# Patient Record
Sex: FEMALE | Race: WHITE | Employment: OTHER | URBAN - METROPOLITAN AREA
[De-identification: names, ages, dates, MRNs, and addresses within clinical notes are randomized per-mention and may not be internally consistent; named-entity substitution may affect disease eponyms.]

---

## 2017-08-29 ENCOUNTER — TRANSFERRED RECORDS (OUTPATIENT)
Dept: HEALTH INFORMATION MANAGEMENT | Facility: CLINIC | Age: 82
End: 2017-08-29

## 2019-10-29 ENCOUNTER — TRANSFERRED RECORDS (OUTPATIENT)
Dept: HEALTH INFORMATION MANAGEMENT | Facility: CLINIC | Age: 84
End: 2019-10-29

## 2019-10-29 ENCOUNTER — MEDICAL CORRESPONDENCE (OUTPATIENT)
Dept: HEALTH INFORMATION MANAGEMENT | Facility: CLINIC | Age: 84
End: 2019-10-29

## 2019-10-31 ENCOUNTER — PRE VISIT (OUTPATIENT)
Dept: OPHTHALMOLOGY | Facility: CLINIC | Age: 84
End: 2019-10-31

## 2019-10-31 NOTE — TELEPHONE ENCOUNTER
FUTURE VISIT INFORMATION      FUTURE VISIT INFORMATION:    Date: 11/20/19    Time: 7:30am    Location: [facility]  REFERRAL INFORMATION:    Referring provider:  Dr. Piper Roberts, OD     Referring providers clinic:  [facility]     Reason for visit/diagnosis  Diplopia     RECORDS REQUESTED FROM:       Clinic name Comments Records Status Imaging Status   [facility]  recs received and scanned into chart EPIC    [facility] MRI Head Brain done 8/29/17- requested imaging be pushed to PACS

## 2019-11-18 ENCOUNTER — DOCUMENTATION ONLY (OUTPATIENT)
Dept: CARE COORDINATION | Facility: CLINIC | Age: 84
End: 2019-11-18

## 2019-11-20 ENCOUNTER — OFFICE VISIT (OUTPATIENT)
Dept: OPHTHALMOLOGY | Facility: CLINIC | Age: 84
End: 2019-11-20
Payer: MEDICARE

## 2019-11-20 DIAGNOSIS — H53.10 SUBJECTIVE VISUAL DISTURBANCE: Primary | ICD-10-CM

## 2019-11-20 DIAGNOSIS — H50.21 HYPOTROPIA OF RIGHT EYE: ICD-10-CM

## 2019-11-20 DIAGNOSIS — H53.2 DIPLOPIA: ICD-10-CM

## 2019-11-20 DIAGNOSIS — H53.2 DIPLOPIA: Primary | ICD-10-CM

## 2019-11-20 ASSESSMENT — CONF VISUAL FIELD
METHOD: COUNTING FINGERS
OS_NORMAL: 1
OD_NORMAL: 1

## 2019-11-20 ASSESSMENT — TONOMETRY
OD_IOP_MMHG: 11
OS_IOP_MMHG: 9
IOP_METHOD: ICARE

## 2019-11-20 ASSESSMENT — MARGIN REFLEX DISTANCE
OS_MRD1: 4
OD_MRD1: 4.5

## 2019-11-20 ASSESSMENT — CUP TO DISC RATIO
OS_RATIO: 0.2
OD_RATIO: 0.2

## 2019-11-20 ASSESSMENT — SLIT LAMP EXAM - LIDS
COMMENTS: NORMAL
COMMENTS: NORMAL

## 2019-11-20 ASSESSMENT — EXTERNAL EXAM - RIGHT EYE: OD_EXAM: NORMAL

## 2019-11-20 ASSESSMENT — VISUAL ACUITY
OS_SC: 20/20
OD_PH_SC: 20/30
METHOD: SNELLEN - LINEAR

## 2019-11-20 ASSESSMENT — EXTERNAL EXAM - LEFT EYE: OS_EXAM: NORMAL

## 2019-11-20 NOTE — PROGRESS NOTES
Assessment & Plan     Lisa Vyas is a 85 year old female with the following diagnoses:   1. Diplopia    2. Hypotropia of right eye         Patient is an 85 year old female sent by Dr. Roberts for consultation of diplopia. Patient reports that she developed intermittent binocular diagonal diplopia first noticed in the summer, but worse since September. She notes that the diplopia is worse when looking up. Notes that images overlap and are not completely separate. For the past two weeks she reports that she has parieto-occipital pain for the past two weeks. Denies diurnal variation, change in orientation of diplopia, ptosis, dysphagia, dyspnea, drooling, facial numbness, numbness/tingling, weakness, jaw claudication, unintended weight loss, scalp tenderness.     Brain MRI 2017    POH: Cataract surgery both eyes 2004. Using Systane  PMH: Thyroid nodules s/p biopsey 2017  FH: Negative for glaucoma, macular degeneration    Visual acuity is 20/40 right eye and 20/20 left eye. Intraocular pressure is normal. Pupils without afferetn pupillary defect. Color plates 10/11 both eyes. Confrontational visual fields full both eyes. Sensorimotor exam shows a Right hypotropia of 20-25 prism diopters in primary gaze that increases to 25-30 in upgaze. There is a comitant esotropia of 4 prism diopters. There is trace abduction deficit in both eyes, near total supraduction deficit right eye. 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 Slit lamp exam shows mild injection of the conjunctiva in both eyes temporally. Dilated fundus exam unremarkable.    It is my impression that Lisa has a right hypotropia worse in upgaze and right hypoglobus. Will obtain TSI and acetylcholine receptor antibody. I will also obtain MRI orbits to look for silent sinus syndrome or superior orbital mass.  If everything normal will recheck in 4 months and if stable at that time could consider a surgical procedure.              Attending Physician  Attestation:  Complete documentation of historical and exam elements from today's encounter can be found in the full encounter summary report (not reduplicated in this progress note).  I personally obtained the chief complaint(s) and history of present illness.  I confirmed and edited as necessary the review of systems, past medical/surgical history, family history, social history, and examination findings as documented by others; and I examined the patient myself.  I personally reviewed the relevant tests, images, and reports as documented above.  I formulated and edited as necessary the assessment and plan and discussed the findings and management plan with the patient and family. - Brandon Worrell MD  Ophthalmology, PGY-5  Neuro-Ophthalmology Fellow

## 2019-11-20 NOTE — Clinical Note
11/20/2019       RE: Lisa Vyas  [address]   [place] [address]     Dear Colleague,    Thank you for referring your patient, Lisa Vyas, to the [facility] OPHTHALMOLOGY at [facility]. Please see a copy of my visit note below.           Assessment & Plan     Lisa Vyas is a 85 year old female with the following diagnoses:   1. Diplopia    2. Hypotropia of right eye         Patient is an 85 year old female sent by Dr. Roberts for consultation of diplopia. Patient reports that she developed intermittent binocular diagonal diplopia first noticed in the summer, but worse since September. She notes that the diplopia is worse when looking up. Notes that images overlap and are not completely separate. For the past two weeks she reports that she has parieto-occipital pain for the past two weeks. Denies diurnal variation, change in orientation of diplopia, ptosis, dysphagia, dyspnea, drooling, facial numbness, numbness/tingling, weakness, jaw claudication, unintended weight loss, scalp tenderness.     Brain MRI 2017    POH: Cataract surgery both eyes 2004. Using Systane  PMH: Thyroid nodules s/p biopsey 2017  FH: Negative for glaucoma, macular degeneration    Visual acuity is 20/40 right eye and 20/20 left eye. Intraocular pressure is normal. Pupils without afferetn pupillary defect. Color plates 10/11 both eyes. Confrontational visual fields full both eyes. Sensorimotor exam shows a Right hypotropia of 20-25 prism diopters in primary gaze that increases to 25-30 in upgaze. There is a comitant esotropia of 4 prism diopters. There is trace abduction deficit in both eyes, near total supraduction deficit right eye. 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 Slit lamp exam shows mild injection of the conjunctiva in both eyes temporally. Dilated fundus exam unremarkable.    It is my impression that Lisa has a right hypotropia worse in upgaze and right hypoglobus. Will  obtain TSI and acetylcholine receptor antibody. I will also obtain MRI orbits to look for silent sinus syndrome or superior orbital mass.  If everything normal will recheck in 4 months and if stable at that time could consider a surgical procedure.              Attending Physician Attestation:  Complete documentation of historical and exam elements from today's encounter can be found in the full encounter summary report (not reduplicated in this progress note).  I personally obtained the chief complaint(s) and history of present illness.  I confirmed and edited as necessary the review of systems, past medical/surgical history, family history, social history, and examination findings as documented by others; and I examined the patient myself.  I personally reviewed the relevant tests, images, and reports as documented above.  I formulated and edited as necessary the assessment and plan and discussed the findings and management plan with the patient and family. - Brandon Worrell MD  Ophthalmology, PGY-5  Neuro-Ophthalmology Fellow      Again, thank you for allowing me to participate in the care of your patient.      Sincerely,    Brandon Neil MD

## 2019-11-20 NOTE — LETTER
2019         RE:  :  MRN: Lisa Vyas  1933  [id number]     Dear Dr. Roberts,    Thank you for asking me to see your very pleasant patient, Lisa Vyas, in neuro-ophthalmic consultation.  I would like to thank you for sending your records and I have summarized them in the history of present illness. She presented with her daughter who provided additional history.  My assessment and plan are below.  For further details, please see my attached clinic note.      Assessment & Plan     Lisa Vyas is a 85 year old female with the following diagnoses:   1. Diplopia    2. Hypotropia of right eye       Patient is an 85 year old female sent by Dr. Roberts for consultation of diplopia. Patient reports that she developed intermittent binocular diagonal diplopia first noticed in the summer, but worse since September. She notes that the diplopia is worse when looking up. Notes that images overlap and are not completely separate. For the past two weeks she reports that she has parieto-occipital pain for the past two weeks. Denies diurnal variation, change in orientation of diplopia, ptosis, dysphagia, dyspnea, drooling, facial numbness, numbness/tingling, weakness, jaw claudication, unintended weight loss, scalp tenderness.     Brain MRI 2017    POH: Cataract surgery both eyes . Using Systane  PMH: Thyroid nodules s/p biopsey 2017  FH: Negative for glaucoma, macular degeneration    Visual acuity is 20/40 right eye and 20/20 left eye. Intraocular pressure is normal. Pupils without afferetn pupillary defect. Color plates 10/11 both eyes. Confrontational visual fields full both eyes. Sensorimotor exam shows a Right hypotropia of 20-25 prism diopters in primary gaze that increases to 25-30 in upgaze. There is a comitant esotropia of 4 prism diopters. There is trace abduction deficit in both eyes, near total supraduction deficit right eye. Right hypoglobus.Miesha base 96. Slit lamp exam shows mild  injection of the conjunctiva in both eyes temporally. Dilated fundus exam unremarkable.    It is my impression that Lisa has a right hypotropia worse in upgaze and right hypoglobus. Will obtain TSI and acetylcholine receptor antibody. I will also obtain MRI orbits to look for silent sinus syndrome or superior orbital mass.  If everything normal will recheck in 4 months and if stable at that time could consider a surgical procedure.         Again, thank you for allowing me to participate in the care of your patient.      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  To earn COPE credits!  How: Online registration can be completed at:    [email or web address]  Cost = $100 early bird registration (before Thursday, January 24, 2020)              $125 up to the day of the event    8:00     Registration and Coffee & Pastries   8:30 Is the Future of Thyroid Eye Disease (SATHISH) Management Here?  9:00 Retina and Uveitis Causes of Optic Disc Edema           9:30 Tips and Tricks in Strabismus   10:00 Break                                                       10:30 Developments in Adult Strabimus  11:00 Guest Speaker- Dr. Sharona Rdz- [facility]  11:30 10 Times to Look Beyond the Cataract  12:00 Buffet Lunch   12:30 Guest Speaker- Dr. Sharona Rdz- [facility]                                                     1:15 Medicines that Blind                                                                                          1:45 Management of Facial Paralysis   2:15 Break   2:30 Cases with Colleagues   4:00     Course Ends

## 2019-11-20 NOTE — NURSING NOTE
Chief Complaints and History of Present Illnesses   Patient presents with     Diplopia Evaluation     Chief Complaint(s) and History of Present Illness(es)     Diplopia Evaluation     Laterality: both eyes    Quality: vertical    Duration: 5 months    Frequency: intermittently    Course: gradually worsening    Associated symptoms: blurred vision    Treatments tried: eye drops    Pain scale: 0/10              Comments     Lisaboni Vyas is being seen today at the request of Piper Roberts for Diplopia. Pt states noticed first episode of double vision while reading her ipad in the summer. Pt feels that right eye appears bigger. Double vision constant when looking up. Notes nodules on thyroid. Systane BID each eye.    MECCA Lam COT 7:23 AM November 20, 2019

## 2019-11-21 ENCOUNTER — TRANSFERRED RECORDS (OUTPATIENT)
Dept: HEALTH INFORMATION MANAGEMENT | Facility: CLINIC | Age: 84
End: 2019-11-21

## 2019-11-21 LAB — ACHR BIND AB SER-SCNC: 0 NMOL/L (ref 0–0.4)

## 2019-11-22 LAB — TSI SER-ACNC: 3.3 TSI INDEX

## 2019-11-25 ENCOUNTER — TELEPHONE (OUTPATIENT)
Dept: OPHTHALMOLOGY | Facility: CLINIC | Age: 84
End: 2019-11-25

## 2019-11-25 NOTE — TELEPHONE ENCOUNTER
----- Message from Brandon Neil MD sent at 11/25/2019  8:27 AM CST -----  Please contact patient with results and new plan of care below:       Lisa,      Your thyroid stimulating immunoglobulin was not normal.  This would argue that you may have a condition called Thyroid eye disease which would explain your double vision and bulging eye.  The MRI will help us make that determination.  I will contact you after I see the results.     Thank you for allowing me to share in your care.     Brandon Neil MD

## 2019-11-25 NOTE — LETTER
Thyroid Eye Disease  An Overview  Anatoliy Helm M.D.  Bradnon Neil M.D.  Jasson Angelo M.D.     INTRODUCTION  Thyroid eye disease, thyroid orbitopathy and dysthyroid ophthalmopathy are all names which describe a disorder resulting from inflammation of the muscles that move the eyes and the fat within the bony orbit that surrounds the eyes.  Thyroid eye disease may cause the eyes to bulge forward and the lids to become swollen, red and retracted. This disease occurs four times more in females and although it may occur at any age, it is most common in middle aged individuals. It is important to note that 10% of all women will have a thyroid abnormality by the age of 55, and 20% of these individuals will have clinically significant eye changes.     ASSOCIATION WITH THYROID ABNORMALITIES  Thyroid eye disease is typically associated with disorders of the thyroid gland which sits in front of the lower throat. This gland produces thyroxine which is a hormone which affects appetite, metabolism, heart rate and body temperature.  Symptoms associated with high levels of thyroxine, or hyperthyroidism, include weight loss, nervousness, tremors, hair loss, menstrual irregularities and rapid pulse. Low levels, or hypothyroidism, may cause weight gain, depression, fatigue, and cold intolerance. Up to 10% of patients with thyroid abnormalities may develop thyroid eye disease. Sixty percent develop the eye disease within a year of the thyroid problems. Some patients with thyroid eye disease never have any thyroid problem at all. Although thyroid eye disease is associated with thyroid conditions, the two diseases progress and respond to treatment independently.     CAUSE OF THE DISEASE  The cause of thyroid eye disease is still unknown. It is likely due to an immune system attack on the muscle, fat and tear gland surrounding the eye. Other immune disorders include rheumatoid arthritis, lupus, diabetes and myasthenia gravis.  Patients with one immune disorder are often at risk for having others.     SYMPTOMS AND SIGNS     1. EYE PROTRUSION (PROPTOSIS OR EXOPHTHALMOS)      Swelling of the tissues behind the eyes may cause the eyes to protrude.  There are 6 muscles that move the eye. Four of these, the inferior rectus, medial rectus, lateral rectus and superior rectus are most frequently involved. These muscles originate behind the eye at the peak of the eye socket and attach to the eye just behind the cornea. The muscles cannot be seen on the surface of the eye but may become visible as the blood vessels over their anterior portion become prominent with the inflammatory reaction. The immune system singles out the fibroblasts, support cells within the muscles causing the muscles to enlarge.  With muscle enlargement, the eyeball is pushed forward. Frequently one eye is more bulged than the other, but generally both eyes become involved. As the muscles enlarge three things may happen: the eyeball protrudes, the muscles become stiff leading to abnormal movement and the muscles may press on the optic nerve (which transmits visual information from the eye to the brain).     2. EYELID RETRACTION   Inflammation and scarring of the muscles that open the eyelids may leave them abnormally open causing the characteristic  stare . This may also be exaggerated by the protrusion of the eyes in some cases.  In some patients, the eyelids may become so retracted that the eyes don t close completely, even during sleep.     3. DRY EYES AND EXCESSIVE TEARING   Bulging of the eye, eyelid retraction and inflammation of the tear gland may lead to exposure of the eye with evaporation of the protective tear film layer. This leads to dry eye syndrome with foreign body sensation ( sand in the eyes ) aching, burning and occasionally excessive reflex tearing due to the dryness. Severe cases may lead to breakdown in the surface of the cornea (the clear tissue in front  of the iris) with development of ulceration.     4. EYE AND EYELID REDNESS AND SWELLING   Poor venous drainage secondary to the swollen tissues behind the eyes may cause redness and swelling of the eyelids and conjunctiva (the clear membrane that lines the eyeball). In severe cases the swollen conjunctiva may look like a blister or  jelly  hanging over the eyelid. We strongly advise against the use of over-the-counter eyedrops that  get the red out.  These medications can actually make the problem worse and lead to severe problems.     5. GLAUCOMA   Some patients will develop elevated intraocular pressure (pressure inside the eye) which can lead to glaucoma. This is usually not painful, but if undetected and untreated may lead to vision loss.         6. DOUBLE VISION (DIPLOPIA)      Inflammation and scarring of the muscles that move the eyes may lead to poor movement. In mild cases, one might feel a pulling a sensation in the eyes. With more advanced disease, double vision may develop when looking in certain directions - usually up and out.  The inferior rectus muscle underneath the eye is the most frequently affected. When this muscle becomes stiff, the eye cannot look up normally, leading to double vision with one image above the other. In very severe cases the movement of the eyes can be very restricted with obvious misalignment of the eyes and constant double vision.     7. OPTIC NERVE COMPRESSION      Severe swelling of the muscles near the back of the orbit may press on the optic nerve (the cable carrying vision from the eye to the brain).  Early symptoms include dimming of vision and fading of colors.  Permanent visual loss may occur if this process is not recognized and promptly treated.  This severe complication occurs in only 5% of patients with thyroid eye disease and can be reversible if the pressure on the nerve is relieved.     COURSE OF THYROID EYE DISEASE   Thyroid eye disease typically has an  inflammatory phase which lasts 1 to 2 years (range 6 months to 5 years). After the inflammation has subsided, patients are left with a number of structural changes around the eyes that may require treatment.  Recurrences of the active phase occur in about 1% of patients. There is no perfect test to determine when a patient has passed from the  active  phase to  inactive  and instead we rely on your and our wick of observation. We assume that if the eyes have not changed for 6 months, then you have entered the  inactive  phase. We also obtain a  blood test  for thyroid stimulating immunoglobulin (TSIG) which gives some evidence of the immune system s reaction against the thyroid and orbital tissues.     It is important to recognize that every patient s course of thyroid eye disease is different and unique.  Some may have minimal signs while others have sudden onset of severe complications such as severe eyelid swelling, bulging of the eyes and vision loss.     We are still unable to determine which complications a particular patient will develop. Patients are therefore followed on a regular basis during the active phase of the disease.  Any new signs (redness, swelling, bulging) or symptoms (double vision, blurring, pain) should be reported immediately in case specific treatment in needed.  A telephone call to your doctor is never  a bother  but is a smart and responsible action.        SMOKING AND STRESS      A clear association between smoking and severity of thyroid eye disease has been demonstrated in many scientific studies, especially for women.  All patients who have thyroid eye disease and smoke should make a strong effort to stop smoking. Stress is also associated with exacerbations of thyroid eye disease.  We are happy to help refer you to the appropriate professionals, if you feel that would be beneficial.     DIAGNOSIS AND INVESTIGATIONS     Thyroid eye disease is diagnosed by the clinical features  described above.  It is confirmed using CT or MRI scans to show the enlarged muscles around the eyes.  Laboratory tests may be used to determine thyroid function (TSH) and inflammatory indicators such as TSIG (described above) may be used to follow the course of your disease.     Other tests may be ordered to document visual function and eye movements.  Photographs will be used to document the appearance of the eyes and  progression over time.     TREATMENT OF ACTIVE PHASE     1. THYROID GLAND TREATMENT   Your endocrinologist may prescribe various medications to suppress or increase your thyroid hormone level.  A radioactive iodine drink or thyroid removal surgery may be offered to destroy part of the gland.  Although these treatments are important for a patient s well-being, they do not appear to influence the course of thyroid eye disease in most patients. Some studies have suggested that radioactive iodine treatment may cause worsening of the eye disease. In our experience, this is rare and may be treated with oral steroid medications if necessary.      2. EYE MEDICATIONS   Mild exposure symptoms, dry eyes and excessive tearing can often be treated with lubricating eye drops (artificial tears) and ointments. Sometimes other medications such as topical steroids or Restasis may be prescribed to treat inflammation of the surface of the eyes. Punctal plugs may be placed into the small opening of your tear drain to keep the tears and medications around longer.     3. LIFESTYLE CHANGES   Swelling changes may be relieved by cutting the amount of salt in your diet and by elevating the head of your bed by placing bricks under the supports at the head of your bed.     4. ANTI-INFLAMMATORY MEDICATIONS   Moderate to severe inflammation of the eyelids and conjunctiva may be treated with corticosteroids (prednisone) or other immune-modulating medications.  Some people respond well to these medications but others do not.   If a person is going to respond, they usually do so rapidly within the first few days.  In cases of optic nerve compression, these medications may be used in combination with radiation and/or surgery.  Because these medications have serious side effects, they are not used for jeannette periods of time (more than 6-8 weeks) or in cases of mild thyroid eye disease.     5. STEROID INJECTIONS   Due to the serious side effects of oral steroid medicines, we have been injecting small amounts of  these medicines around the eyes in patients with persistent inflammation. The injections are performed in the office and typically only take a few minutes to perform and the benefits last 6-8 weeks. Some patients require multiple injections until the disease burns out.     6. RADIATION THERAPY   Radiation therapy has been shown to reduce inflammation and is offered in cases of thyroid eye disease with moderate to severe soft tissue signs. The treatment is performed by a radiation specialist over a period of 1 to 2 weeks with each treatment lasting 30 to 60 minutes. The effects of the radiation may not be seen for 6-8 weeks after the last treatment. The exact mechanism of action of radiation of the tissues behind the eyes is still unknown, but we have seen excellent results in carefully selected cases. If you are diabetic or pregnant, you should not undergo radiation treatment.     7. ORBITAL DECOMPRESSION   Despite the effectiveness of oral medications, radiation treatment or both, there are some patients who continue to experience loss of vision due to swelling of the muscles which compress the optic nerve. In an effort to salvage the vision in these individuals, orbital decompression surgery is typically performed. In this procedure, bone is removed from the orbit to allow the orbital tissues to relax into the sinuses behind the eyes, taking the pressure off of the optic nerve.  This procedure may be performed by an oculoplastic  surgeon, occasionally with  the help of an ear, nose and throat specialist who specializes in sinus surgery.     TREATMENT IN THE INACTIVE PHASE     After your disease has passed into the inactive phase, there are a number of procedures that are available to help recapture some of your normal eye function and appearance.     1. ORBITAL DECOMPRESSION SURGERY      Orbital decompression surgery is performed to allow the eyes to recess back into the orbital space. The procedure typically involves removing one or more of the bony walls surrounding the eyes as well as some of the excess fat that has developed behind the eyes. The surgery is performed for relief of bulging and will also alleviate the pressure sensation that many patients experience. Surgery takes approximately one hour for each eye and is often staged so that each eye has time to recover. This surgery is typically performed by an ophthalmologist trained in orbital surgery.     2. MUSCLE ALIGNMENT (STRABISMUS) SURGERY      Following orbital decompression surgery, 10-20% of patients will develop new or worsening of their double vision. Strabismus surgery is performed to re-align the eyes to help with this. Often patients will regain single vision in straight ahead gaze but may continue to have double in side or up and down gazes. This surgery is performed under general anesthesia and takes 1-2 hours.  Often the muscle is put on a temporary suture that is permanently sutured in the correct position after the patient wakes up from the general anesthesia.  It is performed by an ophthalmologist trained in complex muscle surgery in kids and adults.     3. EYELID REPOSITIONING SURGERY      Many patients with thyroid eye disease have eyelid malpositions, primarily retraction of the upper and sometimes the lower eyelids. Strabismus surgery will often accentuate these problems. Upper eyelid recession is performed to lengthen and lower the upper eyelid. Often this can  be performed through the inside of the eyelid preventing an external incision and scar. If the eyelid retraction is severe, the procedure is performed through an incision in the natural eyelid crease.  Lower eyelids may be pulled downward in thyroid eye disease. Surgery is typically performed through a small incision made in the outer corner of the eyelids and an internal incision made on the inside of the eyelid. When the lower lid retraction is severe, a spacer graft may be placed on the inside of the eyelid to  splint  the eyelid upward. Surgery is usually performed on an outpatient basis under sedation and typically takes 20-30 minutes per lid and is performed by an ophthalmologist trained in plastic and reconstructive surgery around the eye.     4. COSMETIC EYELID SURGERY      Many patients develop prolapse of fat and excess skin following severe swelling that sometimes accompanies thyroid eye disease. Corrective surgery may be performed to remove some of this excess tissue. This surgery is usually performed on an outpatient basis under sedation and takes approximately one hour. It is performed by an ophthalmologist trained in plastic and reconstructive surgery around the eye     FREQUENTLY ASKED QUESTIONS   The doctors tell me they fixed my thyroid and that is now normal. Why are my eyes acting up?     In Graves  disease the thyroid gland is stimulated by the immune system to produce too much hormone. This excess hormone results in nervousness, palpitations, weight loss and tremors. Treatment is aimed at limiting the gland s ability to produce thyroid hormone.  This may be done with medications, radioactive iodine or surgery.  This does not, however, affect the primary auto-immune process and the immune system may continue to target other tissues including the tissues behind the eyes.  Orbital symptoms may even worsen following thyroid gland treatment.  As discussed, the eye and orbit must be treated  separately.     The steroids make my eyes much more comfortable. Can t I just continue using them?     Steroid therapy is very effective in halting the inflammatioin caused by thyroid eye disease and partially shrinking the muscle tissue. Steroid side effects are very common with continued treatment and can lead to high blood pressure, diabetes, weight gain and bone problems.  If there are continued problems with double vision, exposure of the eye or loss of vision then radiation treatment, steroid injections or surgery should be considered.     Why can t you fix my eyelids now?   Eye muscle surgery on the vertically acting muscles of the eye may cause a change in eyelid position.  Thus, we do not perform eyelid surgery until we have completed all other orbital and eye muscle surgery.

## 2019-11-25 NOTE — TELEPHONE ENCOUNTER
Contacted patient about results of labs TSI lab per below.  Will get MRI and reach out to patient after.  If MRI normal it would suggest patient has thyroid eye disease.      Malini Smith on 11/25/2019 at 9:36 AM

## 2019-11-25 NOTE — TELEPHONE ENCOUNTER
Contacted patient about results of MRI showing no evidence of tumor and findings consistent with thyroid eye disease.  Will have patient follow up in 2 months in thyroid eye clinic or sooner with worsening.      Malini Smith on 11/25/2019 at 4:51 PM

## 2020-01-27 ENCOUNTER — OFFICE VISIT (OUTPATIENT)
Dept: OPHTHALMOLOGY | Facility: CLINIC | Age: 85
End: 2020-01-27
Attending: OPHTHALMOLOGY
Payer: MEDICARE

## 2020-01-27 DIAGNOSIS — H57.89 THYROID EYE DISEASE: Primary | ICD-10-CM

## 2020-01-27 DIAGNOSIS — E07.9 THYROID EYE DISEASE: Primary | ICD-10-CM

## 2020-01-27 DIAGNOSIS — H50.21 HYPOTROPIA OF RIGHT EYE: ICD-10-CM

## 2020-01-27 PROCEDURE — 92060 SENSORIMOTOR EXAMINATION: CPT | Mod: ZF | Performed by: OPHTHALMOLOGY

## 2020-01-27 PROCEDURE — 92285 EXTERNAL OCULAR PHOTOGRAPHY: CPT | Mod: ZF | Performed by: OPHTHALMOLOGY

## 2020-01-27 PROCEDURE — G0463 HOSPITAL OUTPT CLINIC VISIT: HCPCS | Mod: 25,ZF

## 2020-01-27 ASSESSMENT — TONOMETRY
OD_IOP_MMHG: 13
OS_IOP_MMHG: 14
OD_IOP_MMHG: 14
IOP_METHOD: ICARE
OS_IOP_MMHG: 14

## 2020-01-27 ASSESSMENT — EXTERNAL EXAM - LEFT EYE: OS_EXAM: NORMAL

## 2020-01-27 ASSESSMENT — VISUAL ACUITY
OD_SC+: -1
METHOD: SNELLEN - LINEAR
OS_SC: 20/20
OS_SC+: -1
OD_SC: 20/20

## 2020-01-27 ASSESSMENT — SLIT LAMP EXAM - LIDS
COMMENTS: NORMAL
COMMENTS: NORMAL

## 2020-01-27 ASSESSMENT — MARGIN REFLEX DISTANCE
OS_MRD1: 4
OD_MRD1: 6
OS_MRD2: 5
OD_MRD2: 5

## 2020-01-27 ASSESSMENT — EXTERNAL EXAM - RIGHT EYE: OD_EXAM: NORMAL

## 2020-01-27 NOTE — NURSING NOTE
Chief Complaint(s) and History of Present Illness(es)     Thyroid Disease     Laterality: right eye    Associated symptoms: double vision.  Negative for eye pain (DV when looks up)              Comments     TSI 3.3, MRI showed enlarged RIR

## 2020-01-27 NOTE — PROGRESS NOTES
Assessment & Plan     HPI:   Patient is new to [facility]. She was previously seen by Dr. Neil in 11/2019 for diplopia. Patient started having double vision in the summer of 2019. She feels like that the double vision is variable. The diplopia is vertical and the images are tilted. Patient feels that when she looks up or down, the double vision is the worst. She does not experience too much double vision when she is outdoor. Patient had a nodule biopsied in 2017. Patient's mother and sister had thyroid diseases.   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     Referring physician: Dr. Brandon Neil    Thyroid history:  Diagnosed when? NA  SWEET: No  Thyroidectomy: No    TSI (date):11/20/2019, 3.3      Previous results: NA    Eye symptoms (since when):   Proptosis (better/worse/same since last visit): noticed bulging of the right eye  Diplopia(better/worse/same since last visit): variable  Eyelid retraction(better/worse/same since last visit): NA  Tearing(better/worse/same since last visit): No complaint  Redness (better/worse/same since last visit): No   Pain ((better/worse/same since last visit): No  Pain to move the eyes (better/worse/same since last visit): No  Blurred vision: No    Ocular history:   Orbital decompression (date, details): NA  Strabismus surgery (date, details): NA  Eyelid surgery (date, details): NA    Exam:   Miesha (base): 18/16 (96)    Better/worse same: NA  Strabismus (better/worse/same): better  Eyelid retraction (better/worse/same): NA      Lisa Vyas is a 86 year old female with the following diagnoses:   1. Thyroid eye disease    2. Hypotropia of right eye       MRI 12/2019  Right orbital abnormality. Swelling of the inferior rectus muscle and mild swelling of the medial rectus muscle. Possible tendinous involvement at both locations. There is hazy, vague retro-orbital fat edema/stranding.   Left orbit unremarkable.     Patient's presentation and imaging/lab are consistent  with [facility]. Patient's measurements are still changing. The most bothersome symptom is the double vision. Will need to continue monitoring. Patient to return clinic in 4-6 months.     Kelley Eastman MD  Ophthalmology PGY-2           Attending Physician Attestation:  Complete documentation of historical and exam elements from today's encounter can be found in the full encounter summary report (not reduplicated in this progress note).  I personally obtained the chief complaint(s) and history of present illness.  I confirmed and edited as necessary the review of systems, past medical/surgical history, family history, social history, and examination findings as documented by others; and I examined the patient myself.  I personally reviewed the relevant tests, images, and reports as documented above.  I formulated and edited as necessary the assessment and plan and discussed the findings and management plan with the patient and family. - Brandon Neil MD